# Patient Record
Sex: FEMALE | Race: WHITE | NOT HISPANIC OR LATINO
[De-identification: names, ages, dates, MRNs, and addresses within clinical notes are randomized per-mention and may not be internally consistent; named-entity substitution may affect disease eponyms.]

---

## 2019-09-17 ENCOUNTER — APPOINTMENT (OUTPATIENT)
Dept: SURGERY | Facility: CLINIC | Age: 39
End: 2019-09-17
Payer: MEDICAID

## 2019-09-17 ENCOUNTER — TRANSCRIPTION ENCOUNTER (OUTPATIENT)
Age: 39
End: 2019-09-17

## 2019-09-17 VITALS
OXYGEN SATURATION: 97 % | HEART RATE: 84 BPM | SYSTOLIC BLOOD PRESSURE: 141 MMHG | BODY MASS INDEX: 44.62 KG/M2 | HEIGHT: 63.25 IN | TEMPERATURE: 97.4 F | WEIGHT: 255 LBS | DIASTOLIC BLOOD PRESSURE: 83 MMHG

## 2019-09-17 DIAGNOSIS — E03.9 HYPOTHYROIDISM, UNSPECIFIED: ICD-10-CM

## 2019-09-17 DIAGNOSIS — I10 ESSENTIAL (PRIMARY) HYPERTENSION: ICD-10-CM

## 2019-09-17 DIAGNOSIS — E28.2 POLYCYSTIC OVARIAN SYNDROME: ICD-10-CM

## 2019-09-17 DIAGNOSIS — K76.0 FATTY (CHANGE OF) LIVER, NOT ELSEWHERE CLASSIFIED: ICD-10-CM

## 2019-09-17 DIAGNOSIS — Z00.00 ENCOUNTER FOR GENERAL ADULT MEDICAL EXAMINATION W/OUT ABNORMAL FINDINGS: ICD-10-CM

## 2019-09-17 DIAGNOSIS — E66.01 MORBID (SEVERE) OBESITY DUE TO EXCESS CALORIES: ICD-10-CM

## 2019-09-17 PROCEDURE — 99204 OFFICE O/P NEW MOD 45 MIN: CPT

## 2019-09-17 NOTE — HISTORY OF PRESENT ILLNESS
[de-identified] : Pt is a 40 y/o F with pmhx of hypothyroidism, fatty liver disease, PCOS, HTN, who presents today feeling well interested in bariatric surgery. Pt states she has struggled with her weight for the past 5 years. States she has tried and failed multiple diet and exercise regimens without much success. States that she is not currently exercising. Pt states that she is not working, she has 2 children and lives with her . Denies alcohol or tobacco use. Denies reflux, n,v,c,d. Pt expresses interest in the VSG. Pt to undergo sleep study to evaluate any sleep disordered breathing associated with her morbid obesity.

## 2019-09-17 NOTE — PLAN
[FreeTextEntry1] : Pt to begin workup for bariatric sx\par Pt to see [facility] for final visit\par Pt interested in VSG

## 2019-10-15 ENCOUNTER — APPOINTMENT (OUTPATIENT)
Dept: BARIATRICS | Facility: CLINIC | Age: 39
End: 2019-10-15

## 2019-10-15 VITALS — WEIGHT: 252.19 LBS | BODY MASS INDEX: 44.13 KG/M2 | HEIGHT: 63.5 IN

## 2019-10-18 ENCOUNTER — APPOINTMENT (OUTPATIENT)
Dept: RADIOLOGY | Facility: HOSPITAL | Age: 39
End: 2019-10-18
Payer: MEDICAID

## 2019-10-18 ENCOUNTER — OUTPATIENT (OUTPATIENT)
Dept: OUTPATIENT SERVICES | Facility: HOSPITAL | Age: 39
LOS: 1 days | End: 2019-10-18
Payer: MEDICAID

## 2019-10-18 ENCOUNTER — APPOINTMENT (OUTPATIENT)
Dept: ULTRASOUND IMAGING | Facility: HOSPITAL | Age: 39
End: 2019-10-18
Payer: MEDICAID

## 2019-10-18 PROCEDURE — 76700 US EXAM ABDOM COMPLETE: CPT

## 2019-10-18 PROCEDURE — 76700 US EXAM ABDOM COMPLETE: CPT | Mod: 26

## 2019-10-18 PROCEDURE — 74241: CPT

## 2019-10-18 PROCEDURE — 74241: CPT | Mod: 26

## 2019-11-11 ENCOUNTER — APPOINTMENT (OUTPATIENT)
Dept: PULMONOLOGY | Facility: CLINIC | Age: 39
End: 2019-11-11

## 2019-11-18 ENCOUNTER — APPOINTMENT (OUTPATIENT)
Dept: BARIATRICS | Facility: CLINIC | Age: 39
End: 2019-11-18